# Patient Record
(demographics unavailable — no encounter records)

---

## 2025-01-28 NOTE — PLAN
[FreeTextEntry1] : Mr. Carmona presents for follow-up evaluation.  1.  Patient will continue current medication regimen which has been reviewed and revised. 2.  Patient went for comprehensive blood profile yesterday.  Results are pending. 3.  Prescription for nasal pillows has been given for his CPAP.  I have explained to the patient that he has severe obstructive sleep apnea with episodes of desaturation.  The consequences of untreated sleep apnea were discussed. 4.  Follow-up in 6 months.

## 2025-01-28 NOTE — HISTORY OF PRESENT ILLNESS
[FreeTextEntry1] : Follow-up [de-identified] : Mr. Carmona presents for a follow-up evaluation.  He is feeling well. He has severe obstructive sleep apnea.  Home polysomnography examination performed on 6/19/2024 showed severe obstructive sleep apnea with an AHI of 84.2 with a guillermo O2 saturation of 71%.  86.1 minutes of study time was spent with a saturation less than or equal to 88%.  Snoring was moderate.  He has subsequently had a CPAP titration which showed significant improvement obstructive sleep apnea at 13 cm H2O.  The AHI was reduced to 3.7 events per hour.  Patient has not been wearing his CPAP due to uncomfort with his current mask and wants nasal pillows.